# Patient Record
Sex: MALE | Race: WHITE | Employment: FULL TIME | URBAN - METROPOLITAN AREA
[De-identification: names, ages, dates, MRNs, and addresses within clinical notes are randomized per-mention and may not be internally consistent; named-entity substitution may affect disease eponyms.]

---

## 2022-12-26 ENCOUNTER — HOSPITAL ENCOUNTER (EMERGENCY)
Age: 27
Discharge: HOME | End: 2022-12-26
Attending: EMERGENCY MEDICINE
Payer: COMMERCIAL

## 2022-12-26 VITALS
RESPIRATION RATE: 18 BRPM | HEART RATE: 73 BPM | TEMPERATURE: 97.5 F | DIASTOLIC BLOOD PRESSURE: 85 MMHG | OXYGEN SATURATION: 98 % | SYSTOLIC BLOOD PRESSURE: 136 MMHG

## 2022-12-26 DIAGNOSIS — J34.89 NASAL LESION: Primary | ICD-10-CM

## 2022-12-26 PROCEDURE — 99283 EMERGENCY DEPT VISIT LOW MDM: CPT

## 2022-12-26 PROCEDURE — 87252 VIRUS INOCULATION TISSUE: CPT

## 2022-12-26 RX ORDER — MUPIROCIN 20 MG/G
OINTMENT TOPICAL 2 TIMES DAILY
Qty: 22 G | Refills: 0 | Status: SHIPPED | OUTPATIENT
Start: 2022-12-26 | End: 2023-01-09

## 2022-12-26 NOTE — ED TRIAGE NOTES
Pt arrives ambulatory from home for burning at the tip of his nose. He states he has been blowing his nose recently and he has a few scabs at the opening of his nares. No bleeding. No obvious signs of infection. States this started 2 days ago. A&OX4.

## 2022-12-26 NOTE — ED PROVIDER NOTES
32year old male presenting for skin problem. Pt notes that he had some rhinorrhea, then started with some burning around the open of the nose. Has put some aloe and neosporin on it. Started this morning. Had been blowing his nose some yesterday. Was \"just kind of irritated. \"  No prior history of HSV. PMHx: list UTD  PSx: denies  Social: Non-smoker. Works for [employer]    The history is provided by the patient. Skin Problem        No past medical history on file. No past surgical history on file. No family history on file. Social History     Socioeconomic History    Marital status: Not on file     Spouse name: Not on file    Number of children: Not on file    Years of education: Not on file    Highest education level: Not on file   Occupational History    Not on file   Tobacco Use    Smoking status: Not on file    Smokeless tobacco: Not on file   Substance and Sexual Activity    Alcohol use: Not on file    Drug use: Not on file    Sexual activity: Not on file   Other Topics Concern    Not on file   Social History Narrative    Not on file     Social Determinants of Health     Financial Resource Strain: Not on file   Food Insecurity: Not on file   Transportation Needs: Not on file   Physical Activity: Not on file   Stress: Not on file   Social Connections: Not on file   Intimate Partner Violence: Not on file   Housing Stability: Not on file         ALLERGIES: Patient has no known allergies. Review of Systems   Constitutional:  Negative for fever. HENT:  Negative for facial swelling. Respiratory:  Negative for shortness of breath. Cardiovascular:  Negative for chest pain. Gastrointestinal:  Negative for vomiting. Skin:  Positive for wound. Neurological:  Negative for syncope. All other systems reviewed and are negative.     Vitals:    12/26/22 0214   BP: 136/85   Pulse: 73   Resp: 18   Temp: 97.5 °F (36.4 °C)   SpO2: 98%            Physical Exam  Vitals and nursing note reviewed. Constitutional:       Appearance: He is well-developed. HENT:      Head: Normocephalic. Comments: Left nose: See photo. Few small superficial scabbed areas. No vesicles. Some erythema to the nasal mucosa. No abscess or pustules. Eyes:      Conjunctiva/sclera: Conjunctivae normal.   Cardiovascular:      Rate and Rhythm: Normal rate. Pulmonary:      Effort: Pulmonary effort is normal. No respiratory distress. Musculoskeletal:         General: Normal range of motion. Cervical back: Neck supple. Skin:     General: Skin is warm and dry. Neurological:      Mental Status: He is alert and oriented to person, place, and time. MDM  Number of Diagnoses or Management Options  Nasal lesion  Diagnosis management comments: 15-year-old male presenting to the ED for skin problem that started as a burning discomfort, now with some small open areas and scabs. Patient notes that he has been rubbing at the nose a lot as he has had diarrhea. Discussed with patient that symptoms could possibly be due to a staph infection, discussed possibility of MRSA colonization of the nostrils. Given that patient reported what sounds like a prodrome of burning, did also discuss possibility of HSV, although patient has never had a cold sore which would make it a little bit less likely for a primary outbreak. Will start bacitracin ointment, viral culture sent. No dermatomal distribution rash concerning for shingles. Return precautions given.        Amount and/or Complexity of Data Reviewed  Discuss the patient with other providers: yes (Dr. Agatha Cm ED attending)           Procedures

## 2022-12-26 NOTE — DISCHARGE INSTRUCTIONS
Return for new or worsening symptoms. As discussed, the findings on your nose could possibly be a staph infection originating from a bacteria that lives inside of your nostrils, it could also be a herpes infection of the skin, although I think this may be less likely given that you have never had a cold sore in the past.  Apply ointment to nose as directed. Keep clean. If the viral culture is positive for herpes, we will call you in 4 to 5 days.   You may also follow the results on your [facility] portal.

## 2022-12-30 LAB
SPECIMEN SOURCE: NORMAL
VIRUS SPEC CULT: NORMAL